# Patient Record
Sex: FEMALE | Race: WHITE
[De-identification: names, ages, dates, MRNs, and addresses within clinical notes are randomized per-mention and may not be internally consistent; named-entity substitution may affect disease eponyms.]

---

## 2018-10-22 ENCOUNTER — HOSPITAL ENCOUNTER (EMERGENCY)
Dept: HOSPITAL 59 - ER | Age: 53
Discharge: HOME | End: 2018-10-22
Payer: COMMERCIAL
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
## 2018-10-22 DIAGNOSIS — S61.210A: Primary | ICD-10-CM
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
## 2018-10-22 PROCEDURE — 99282 EMERGENCY DEPT VISIT SF MDM: CPT

## 2018-10-22 PROCEDURE — 90715 TDAP VACCINE 7 YRS/> IM: CPT

## 2018-10-22 PROCEDURE — 12002 RPR S/N/AX/GEN/TRNK2.6-7.5CM: CPT

## 2018-10-22 PROCEDURE — 99283 EMERGENCY DEPT VISIT LOW MDM: CPT

## 2018-10-22 PROCEDURE — 96372 THER/PROPH/DIAG INJ SC/IM: CPT

## 2018-10-22 NOTE — EMERGENCY DEPARTMENT RECORD
History of Present Illness





- General


Chief Complaint: Laceration(s)


Stated Complaint: RT INDEX LAC


Time Seen by Provider: 10/22/18 20:48


Source: Patient


Mode of Arrival: Ambulatory


Limitations: No limitations





- History of Present Illness


Initial Commments: 





52 yo female presents to ED for evaluation following laceration of the tip of 

the right index finger while using a slicer this evening.  Patient reports 

bleeding that she was unable to get stopped resulting in presentation to the 

ED.  Patient denies numbness, tingling, or weakness to the digit.  Patient 

reports that her tetanus is not UTD.


Onset/Timin


-: Minutes(s)


Extremity Location: Right: Hand


Place: Home


Context: Accidental


Associated Symptoms: Other (bleeding)


Treatments Prior to Arrival: Bandage





- [place] Coma Scale


Eye Response: (4) Open spontaneously


Motor Response: (6) Obeys commands


Verbal Response: (5) Oriented


Gato Total: 15





- Related Data


Patient Tetanus UTD (within 5 yrs): No


 Home Medications











 Medication  Instructions  Recorded  Confirmed  Last Taken


 


Diazepam [Valium] 5 mg PO Q8H PRN 10/22/18 10/22/18 Unknown











 Allergies











Allergy/AdvReac Type Severity Reaction Status Date / Time


 


codeine Allergy Intermediate headache, Unverified 11/20/15 08:53





   nausea  














Travel Screening





- Travel/Exposure Within Last 30 Days


Have you traveled within the last 30 days?: No





Review of Systems


Constitutional: Denies: Chills, Fever, Malaise, Night sweats


Eyes: Denies: Eye discharge, Eye pain


ENT: Denies: Congestion, Ear pain, Epistaxis


Respiratory: Denies: Cough, Dyspnea


Cardiovascular: Denies: Chest pain, Dyspnea on exertion


Endocrine: Denies: Fatigue, Heat or cold intolerance


Gastrointestinal: Denies: Abdominal pain, Nausea, Vomiting


Genitourinary: Denies: Incontinence, Retention


Musculoskeletal: Denies: Arthralgia, Back pain, Gout, Joint swelling


Skin: Denies: Bruising, Change in color


Neurological: Denies: Abnormal gait, Confusion, Headache, Seizure


Psychiatric: Denies: Anxiety


Hematological/Lymphatic: Denies: Anemia, Blood Clots





Past Medical History





- SOCIAL HISTORY


Smoking Status: Never smoker


Alcohol Use: None


Drug Use: None





- RESPIRATORY


Hx Respiratory Disorders: No





- CARDIOVASCULAR


Hx Cardio Disorders: Yes


Hx Irregular Heartbeat: Yes (HX of SVT)





- NEURO


Hx Neuro Disorders: No





- GI


Hx GI Disorders: Yes


Hx Reflux: Yes (Barretts)





- 


Hx Genitourinary Disorders: No





- ENDOCRINE


Hx Endocrine Disorders: No





- MUSCULOSKELETAL


Hx Musculoskeletal Disorders: No





- PSYCH


Hx Psych Problems: No





- HEMATOLOGY/ONCOLOGY


Hx Hematology/Oncology Disorders: No





Family Medical History


Any Significant Family History?: No





Physical Exam





- General


General Appearance: Alert, Oriented x3, Cooperative, No acute distress


Limitations: No limitations





- Head


Head exam: Atraumatic, Normocephalic, Normal inspection


Head exam detail: negative: Abrasion, Contusion, Ching's sign, General 

tenderness, Hematoma, Laceration





- Eye


Eye exam: Normal appearance.  negative: Conjunctival injection, Periorbital 

swelling, Periorbital tenderness, Scleral icterus





- ENT


Ear exam: negative: Auricular hematoma, Auricular trauma


Nasal Exam: negative: Active bleeding, Discharge, Dried blood, Foreign body


Mouth exam: negative: Drooling, Laceration, Muffled voice, Tongue elevation





- Neck


Neck exam: Normal inspection.  negative: Meningismus, Tenderness





- Respiratory


Respiratory exam: Normal lung sounds bilaterally.  negative: Rales, Respiratory 

distress, Rhonchi, Stridor





- Cardiovascular


Cardiovascular Exam: Regular rate, Normal rhythm, Normal heart sounds





- GI/Abdominal


GI/Abdominal exam: Soft.  negative: Rebound, Rigid, Tenderness





- Rectal


Rectal exam: Deferred





- 


 exam: Deferred





- Extremities


Extremities exam: Other (1.0 cm lceration to the lateral aspect of the right 

index finger).  negative: Calf tenderness, Pedal edema





- Back


Back exam: Denies: CVA tenderness (R), CVA tenderness (L)





- Neurological


Neurological exam: Alert, Normal gait, Oriented X3





- Psychiatric


Psychiatric exam: Normal affect, Normal mood





- Skin


Skin exam: Normal color.  negative: Abrasion


Type of lesion: negative: abrasion





Course





 Vital Signs











  10/22/18





  20:37


 


Temperature 98.1 F


 


Pulse Rate [ 88





Pulse Ox Probe] 


 


Respiratory 20





Rate 


 


Blood Pressure 159/123





[Left Arm] 


 


Pulse Ox 97














- Reevaluation(s)


Reevaluation #1: 





10/22/18 21:11


Procedure Note:


1.0 cm laceration to the right index finger distally, bleeding controlled.  

Wound was cleaned and prepped in sterile fashion with TLE solution, no residual 

FB identified on examination.  Laceration was repaired with Dermabond solution. 

Patient tolerated the procedure well without complications.


Tetanus was updated prior to discharge as well.


Patient appears stable for discharge at this time.





Disposition


Disposition: Discharge


Clinical Impression: 


Finger laceration


Qualifiers:


 Encounter type: initial encounter Finger: index finger Damage to nail status: 

without damage Foreign body presence: without foreign body Laterality: right 

Qualified Code(s): S61.210A - Laceration without foreign body of right index 

finger without damage to nail, initial encounter





Disposition: Home, Self-Care


Condition: (2) Stable


Instructions:  Skin Adhesive Care (ED)


Additional Instructions: 


Return to ED if your symptoms worsen or if you have any concerns.


Follow-up with your family doctor in 3-5 days as directed.


Forms:  Patient Portal Access


Time of Disposition: 21:14





Quality





- Quality Measures


Quality Measures: N/A





- Blood Pressure Screening


Does Patient Have Any of the Following: No


Blood Pressure Classification: Hypertensive Reading


Systolic Measurement: 159


Diastolic Measurement: 123


Screening for High Blood Pressure: < First Hypertensive BP, F/U Documented > [

]


First Hypertensive Follow-up Interventions: Referral to alternative/primary 

care provider.

## 2020-02-27 ENCOUNTER — HOSPITAL ENCOUNTER (EMERGENCY)
Dept: HOSPITAL 59 - ER | Age: 55
Discharge: HOME | End: 2020-02-27
Payer: COMMERCIAL

## 2020-02-27 DIAGNOSIS — R07.2: Primary | ICD-10-CM

## 2020-02-27 LAB
ABSOLUTE NEUTROPHIL COUNT: 6.36
ALBUMIN SERPL-MCNC: 4.8 G/DL (ref 4–5)
ALBUMIN/GLOB SERPL: 1.4 {RATIO} (ref 1.1–1.8)
ALP SERPL-CCNC: 92 U/L (ref 35–104)
ALT SERPL-CCNC: 39 U/L (ref ?–33)
ANION GAP SERPL CALC-SCNC: 16 MMOL/L (ref 7–16)
AST SERPL-CCNC: 27 U/L (ref 10–35)
BASOPHILS NFR BLD: 0.2 % (ref 0–6)
BILIRUB SERPL-MCNC: 0.3 MG/DL (ref 0.2–1)
BUN SERPL-MCNC: 15 MG/DL (ref 6–20)
CO2 SERPL-SCNC: 22 MMOL/L (ref 22–29)
CREAT SERPL-MCNC: 0.7 MG/DL (ref 0.5–0.9)
EOSINOPHIL NFR BLD: 1.1 % (ref 0–6)
ERYTHROCYTE [DISTWIDTH] IN BLOOD BY AUTOMATED COUNT: 13.4 % (ref 11.5–14.5)
EST GLOMERULAR FILTRATION RATE: > 60 ML/MIN
GLOBULIN SER-MCNC: 3.4 GM/DL (ref 1.4–4.8)
GLUCOSE SERPL-MCNC: 112 MG/DL (ref 74–109)
GRANULOCYTES NFR BLD: 60.6 % (ref 47–80)
HCT VFR BLD CALC: 44.4 % (ref 35–47)
HGB BLD-MCNC: 14.8 GM/DL (ref 11.6–16)
LYMPHOCYTES NFR BLD AUTO: 29.6 % (ref 16–45)
MCH RBC QN AUTO: 26.9 PG (ref 27–33)
MCHC RBC AUTO-ENTMCNC: 33.3 G/DL (ref 32–36)
MCV RBC AUTO: 80.7 FL (ref 81–97)
MONOCYTES NFR BLD: 8.5 % (ref 0–9)
PLATELET # BLD: 361 K/UL (ref 130–400)
PMV BLD AUTO: 9 FL (ref 7.4–10.4)
PROT SERPL-MCNC: 8.2 G/DL (ref 6.6–8.7)
RBC # BLD AUTO: 5.5 M/UL (ref 3.8–5.4)
WBC # BLD AUTO: 10.5 K/UL (ref 4.2–12.2)

## 2020-02-27 PROCEDURE — 80053 COMPREHEN METABOLIC PANEL: CPT

## 2020-02-27 PROCEDURE — 93005 ELECTROCARDIOGRAM TRACING: CPT

## 2020-02-27 PROCEDURE — 71275 CT ANGIOGRAPHY CHEST: CPT

## 2020-02-27 PROCEDURE — 84484 ASSAY OF TROPONIN QUANT: CPT

## 2020-02-27 PROCEDURE — 96374 THER/PROPH/DIAG INJ IV PUSH: CPT

## 2020-02-27 PROCEDURE — 85379 FIBRIN DEGRADATION QUANT: CPT

## 2020-02-27 PROCEDURE — 85025 COMPLETE CBC W/AUTO DIFF WBC: CPT

## 2020-02-27 PROCEDURE — 99284 EMERGENCY DEPT VISIT MOD MDM: CPT

## 2020-02-27 PROCEDURE — 93010 ELECTROCARDIOGRAM REPORT: CPT

## 2020-02-27 NOTE — CT ANGIOGRAM REPORT
EXAMINATION: CT Angiography of the Thorax

EXAM DATE:  2/27/2020 7:46 PM



TECHNIQUE: Standard protocol CT angiogram images were obtained through the chest following the admini
stration of intravenous contrast. Coronal and sagittal MIP 3-D reformations were performed.  

IV Contrast: The amount and type of contrast are recorded in the medical record. 



INDICATION:  chest pain, elevated D-Dimer.  

COMPARISON:  None



ENCOUNTER: Not applicable

_________________________



FINDINGS:



Pulmonary Artery: No pulmonary embolism is present.



Aorta:  No thoracic aortic aneurysm or dissection is present.



Right Heart Strain:  None.



Heart :  The heart is nonenlarged and there is no pericardial effusion. No coronary artery calcificat
ion.



Camilla and Mediastinum: No lymphadenopathy.



Lung Parenchyma:  Normal.



Central Airways:  Normal.



Pleural Effusion:  None.



Upper Abdomen:  Unremarkable.



Musculoskeletal and Chest Wall: Unremarkable.

____________________________



IMPRESSION:





1. No acute intrathoracic process with no PE or thoracic aortic dissection.





Dictated by: José Miguel Arreguin MD on 2/27/2020 8:09 PM.

Electronically signed by: José Miguel Arreguin MD on 2/27/2020 8:12 PM.

## 2020-02-27 NOTE — EMERGENCY DEPARTMENT RECORD
History of Present Illness





- General


Chief Complaint: Chest Pain


Stated Complaint: CHEST PAIN


Time Seen by Provider: 20 18:17


Source: Patient


Mode of Arrival: Ambulatory


Limitations: No limitations





- History of Present Illness


Initial Comments: 





55 yo female presents to ED for evaluation of chest discomfort symptoms that 

radiate to the back for approximately 1 week.  Patient initially believed her 

symptoms were the result of sleeping with her right arm over her head, but 

reports that she has developed chest pressure while at rest.  Patient denies any

change with exertion, denies any musculoskeletal movements that worsen her 

movements.  Patient denies lower extremity swelling, calf pain, or pain with 

deep inspiration on examination.  Patient also denies fevers, chills, or cough 

symptoms.  Patient denies health problems other than Vidal's esophagus.


MD Complaint: Chest pain


Onset/Timin


-: Days(s)


Onset: During rest


Pain Location: Substernal


Pain Radiation: Back


Severity: Moderate


Quality: Heaviness


Consistency: Constant


Improves With: Nothing


Worsens With: Nothing


Treatments Prior to Arrival: None





- Related Data


                                    Allergies











Allergy/AdvReac Type Severity Reaction Status Date / Time


 


codeine Allergy Intermediate headache, Verified 20 18:31





   nausea  














Review of Systems


Constitutional: Denies: Chills, Fever, Malaise, Night sweats


Eyes: Denies: Eye discharge, Eye pain


ENT: Denies: Congestion, Ear pain, Epistaxis


Respiratory: Denies: Cough, Dyspnea


Cardiovascular: Reports: Chest pain.  Denies: Dyspnea on exertion


Endocrine: Denies: Fatigue, Heat or cold intolerance


Gastrointestinal: Denies: Abdominal pain, Nausea, Vomiting


Genitourinary: Denies: Incontinence, Retention


Musculoskeletal: Reports: Back pain.  Denies: Arthralgia


Skin: Denies: Bruising, Change in color


Neurological: Denies: Abnormal gait, Confusion, Headache, Tingling, Tremors


Psychiatric: Denies: Anxiety


Hematological/Lymphatic: Denies: Anemia, Blood Clots





Past Medical History





- SOCIAL HISTORY


Smoking Status: Never smoker


Drug Use: None





- RESPIRATORY


Hx Respiratory Disorders: No





- CARDIOVASCULAR


Hx Cardio Disorders: Yes


Hx Irregular Heartbeat: Yes (HX of SVT)





- NEURO


Hx Neuro Disorders: No





- GI


Hx GI Disorders: Yes


Hx Reflux: Yes (Barretts)





- 


Hx Genitourinary Disorders: No





- ENDOCRINE


Hx Endocrine Disorders: No





- MUSCULOSKELETAL


Hx Musculoskeletal Disorders: No





- PSYCH


Hx Psych Problems: No





- HEMATOLOGY/ONCOLOGY


Hx Hematology/Oncology Disorders: No





Physical Exam





- General


General Appearance: Alert, Oriented x3, Cooperative, Anxious


Limitations: No limitations





- Head


Head exam: Atraumatic, Normocephalic, Normal inspection


Head exam detail: negative: Abrasion, Contusion, Ching's sign, General tender

ness, Hematoma, Laceration





- Eye


Eye exam: Normal appearance.  negative: Conjunctival injection, Periorbital 

swelling, Periorbital tenderness, Scleral icterus





- ENT


Ear exam: negative: Auricular hematoma, Auricular trauma


Nasal Exam: negative: Active bleeding, Discharge, Dried blood, Foreign body, 

Sinus tenderness


Mouth exam: negative: Drooling, Laceration, Muffled voice, Tongue elevation





- Neck


Neck exam: Normal inspection.  negative: Meningismus, Tenderness





- Respiratory


Respiratory exam: Normal lung sounds bilaterally.  negative: Respiratory 

distress, Rhonchi, Stridor, Wheezes





- Cardiovascular


Cardiovascular Exam: Regular rate, Normal rhythm, Normal heart sounds





- GI/Abdominal


GI/Abdominal exam: Soft.  negative: Distended, Rebound, Rigid, Tenderness





- Rectal


Rectal exam: Deferred





- 


 exam: Deferred





- Extremities


Extremities exam: Normal inspection.  negative: Pedal edema, Tenderness





- Back


Back exam: Denies: CVA tenderness (R), CVA tenderness (L)





- Neurological


Neurological exam: Alert, Normal gait, Oriented X3





- Psychiatric


Psychiatric exam: Anxious





- Skin


Skin exam: Normal color.  negative: Abrasion


Type of lesion: negative: abrasion





Course





- Reevaluation(s)


Reevaluation #1: 





20 18:27


EKG: 


LAD, Normal intervals


No acute ST-T wave changes are present.


Reevaluation #2: 





20 19:19


Laboratory studies were reviewed and appear grossly unremarkable for an acute 

process except for the following:


D-Dimer: 0.61


Patient and her SO were updated on all results thus far.  





The patient was deemed to be low-risk for cardiac disease based on the patients

history and evaluation in the ED, HEART Score was applied and found to be 2.  As

a result, repeat Troponin in 3-hours appears appropriate and if negative for 

myocardial injury, the patient may be discharged home with appropriate 

outpatient follow-up for further evaluation.








Reevaluation #3: 





20 20:15


CTA Chest:


No PE or dissection


No acute abnormality


Reevaluation #4: 





20 22:12


Repeat Troponin is negative for myocardial injury.


Patient appears stable for discharge with instructions to follow-up with her PCP

in 3-5 days as directed.





Medical Decision Making





- Lab Data


Result diagrams: 


                                 20 18:35





                                 20 18:35





Disposition


Disposition: Discharge


Clinical Impression: 


Chest pain


Qualifiers:


 Chest pain type: unspecified Qualified Code(s): R07.9 - Chest pain, unspecified





Disposition: Home, Self-Care


Condition: (2) Stable


Instructions:  Chest Pain (ED)


Additional Instructions: 


Return to ED if your symptoms worsen or if you have any concerns.


Follow-up with your family doctor in 3-5 days as directed.


Forms:  Patient Portal Access


Time of Disposition: 22:13





Quality





- Quality Measures


Quality Measures: N/A





- Blood Pressure Screening


Does Patient Have Any of the Following: No


Blood Pressure Classification: Hypertensive Reading


Systolic Measurement: 141


Diastolic Measurement: 106


Screening for High Blood Pressure: < First Hypertensive BP, F/U Documented > 

[]


First Hypertensive Follow-up Interventions: Referral to alternative/primary care

 provider.